# Patient Record
Sex: FEMALE | Race: WHITE | NOT HISPANIC OR LATINO
[De-identification: names, ages, dates, MRNs, and addresses within clinical notes are randomized per-mention and may not be internally consistent; named-entity substitution may affect disease eponyms.]

---

## 2022-02-02 ENCOUNTER — NON-APPOINTMENT (OUTPATIENT)
Age: 77
End: 2022-02-02

## 2022-02-07 ENCOUNTER — NON-APPOINTMENT (OUTPATIENT)
Age: 77
End: 2022-02-07

## 2022-02-07 ENCOUNTER — APPOINTMENT (OUTPATIENT)
Dept: COLORECTAL SURGERY | Facility: CLINIC | Age: 77
End: 2022-02-07
Payer: MEDICARE

## 2022-02-07 VITALS
WEIGHT: 123 LBS | DIASTOLIC BLOOD PRESSURE: 109 MMHG | HEART RATE: 90 BPM | SYSTOLIC BLOOD PRESSURE: 180 MMHG | BODY MASS INDEX: 23.22 KG/M2 | HEIGHT: 61 IN | TEMPERATURE: 98 F

## 2022-02-07 DIAGNOSIS — Z01.818 ENCOUNTER FOR OTHER PREPROCEDURAL EXAMINATION: ICD-10-CM

## 2022-02-07 DIAGNOSIS — Z87.442 PERSONAL HISTORY OF URINARY CALCULI: ICD-10-CM

## 2022-02-07 PROCEDURE — 99205 OFFICE O/P NEW HI 60 MIN: CPT

## 2022-02-07 NOTE — PHYSICAL EXAM
[Exam Deferred] : exam was deferred [Respiratory Effort] : normal respiratory effort [Normal Rate and Rhythm] : normal rate and rhythm [No Edema] : No edema [Alert] : alert [Oriented to Person] : oriented to person [Oriented to Place] : oriented to place [Oriented to Time] : oriented to time [Calm] : calm [Wheezing] : no wheezing was heard [de-identified] : soft, non-tender, non-distended; well healed low midline laparotomy and Mcburney's incisions [de-identified] : female of stated age in NAD [de-identified] : EOMI, no scleral icterus [de-identified] : supple, no JVD [de-identified] : no gross deformity, no gait abnormality

## 2022-02-07 NOTE — HISTORY OF PRESENT ILLNESS
[FreeTextEntry1] : Ms. Vásquez is a pleasant 76 YOF w/ h/o complicated diverticulitis who presents for initial evaluation after first episode of diverticulitis in December. Was briefly admitted to [facility] from 12/19-23  with rigors and on work up was found to have sigmoid diverticulitis with contained perforation. She was discharged on the 23rd after improving with IV abx with a 2 week course of Invanz, and has recovered well without issue. She is here to evaluate for surgical management of her diverticular disease. Currently denies any pain, N/V/D, F/C, or weight changes. Denies any urinary symptoms. Denies any issues with her PICC line or the insertion site.\par \par BH: Daily, 2 soft and brown BMs per day\par Denies any laxative/stool softener/supplemental fiber use. Takes a probiotic supplement.\par Is a vegetarian and usually has fiber however is maintaining low fiber after discharge.\par Has at least 8 glasses of water per day.\par Denies incontinence to feces or flatus, tenesmus, hematochezia or melena. \par \par Last colonoscopy was in 2015 and reportedly had a benign polyp, was told she did not need another one.\par Denies NSAID or ASA use in the last week.\par Denies personal or family history of CRC or IBD.

## 2022-02-07 NOTE — ASSESSMENT
[FreeTextEntry1] : I had extensive discussion with the patient (60 minutes) regarding the diagnosis and treatment options. I recommended that he consider proceeding with a robotic sigmoid colectomy.\par The associated risks, benefits, alternatives of the procedure have been outlined discussed and reviewed with the patient's family. These risks including but not limited to bleeding, infection, anastomotic leak, need for secondary surgery, need for ileostomy or colostomy creation, change in bowel habits,  as well as the risk of heart and lung complications infection and death were detailed. The patient understands these risks and consents the planned procedure. Appropriate  literature regarding surgery and post operative treatment/complications and enhanced recovery pathway has been detailed and reviewed. Consent was obtained. All questions were answered.\par \par Advise CT scan in 2 weeks for repeat assessment/resolution of pelvic abscess.\par \par Pending results of CT scan we will plan for colonoscopy with GI for surveillance of synchronous colorectal cancer and polyps.

## 2022-02-23 ENCOUNTER — APPOINTMENT (OUTPATIENT)
Dept: CT IMAGING | Facility: CLINIC | Age: 77
End: 2022-02-23
Payer: MEDICARE

## 2022-02-23 ENCOUNTER — OUTPATIENT (OUTPATIENT)
Dept: OUTPATIENT SERVICES | Facility: HOSPITAL | Age: 77
LOS: 1 days | End: 2022-02-23

## 2022-02-23 ENCOUNTER — RESULT REVIEW (OUTPATIENT)
Age: 77
End: 2022-02-23

## 2022-02-23 PROCEDURE — 74177 CT ABD & PELVIS W/CONTRAST: CPT | Mod: 26,MG

## 2022-02-23 PROCEDURE — G1004: CPT

## 2022-02-28 ENCOUNTER — NON-APPOINTMENT (OUTPATIENT)
Age: 77
End: 2022-02-28

## 2022-03-09 ENCOUNTER — APPOINTMENT (OUTPATIENT)
Dept: INTERNAL MEDICINE | Facility: CLINIC | Age: 77
End: 2022-03-09
Payer: MEDICARE

## 2022-03-09 ENCOUNTER — NON-APPOINTMENT (OUTPATIENT)
Age: 77
End: 2022-03-09

## 2022-03-09 VITALS
HEIGHT: 61 IN | SYSTOLIC BLOOD PRESSURE: 152 MMHG | DIASTOLIC BLOOD PRESSURE: 92 MMHG | TEMPERATURE: 97.7 F | HEART RATE: 84 BPM | WEIGHT: 123 LBS | BODY MASS INDEX: 23.22 KG/M2 | OXYGEN SATURATION: 98 %

## 2022-03-09 VITALS — DIASTOLIC BLOOD PRESSURE: 82 MMHG | SYSTOLIC BLOOD PRESSURE: 124 MMHG

## 2022-03-09 DIAGNOSIS — Z12.39 ENCOUNTER FOR OTHER SCREENING FOR MALIGNANT NEOPLASM OF BREAST: ICD-10-CM

## 2022-03-09 DIAGNOSIS — Z72.89 OTHER PROBLEMS RELATED TO LIFESTYLE: ICD-10-CM

## 2022-03-09 DIAGNOSIS — Z01.818 ENCOUNTER FOR OTHER PREPROCEDURAL EXAMINATION: ICD-10-CM

## 2022-03-09 PROCEDURE — 36415 COLL VENOUS BLD VENIPUNCTURE: CPT

## 2022-03-09 PROCEDURE — 93000 ELECTROCARDIOGRAM COMPLETE: CPT

## 2022-03-09 PROCEDURE — 99203 OFFICE O/P NEW LOW 30 MIN: CPT | Mod: 25

## 2022-03-09 RX ORDER — NEOMYCIN SULFATE 500 MG/1
500 TABLET ORAL
Qty: 6 | Refills: 0 | Status: DISCONTINUED | COMMUNITY
Start: 2022-02-07 | End: 2022-03-09

## 2022-03-09 RX ORDER — METRONIDAZOLE 500 MG/1
500 TABLET ORAL
Qty: 6 | Refills: 0 | Status: DISCONTINUED | COMMUNITY
Start: 2022-02-07 | End: 2022-03-09

## 2022-03-09 RX ORDER — UBIDECARENONE/VIT E ACET 100MG-5
50 MCG CAPSULE ORAL
Refills: 0 | Status: ACTIVE | COMMUNITY
Start: 2022-03-09

## 2022-03-10 LAB
ALBUMIN SERPL ELPH-MCNC: 4.6 G/DL
ALP BLD-CCNC: 101 U/L
ALT SERPL-CCNC: 13 U/L
ANION GAP SERPL CALC-SCNC: 12 MMOL/L
APPEARANCE: CLEAR
APTT BLD: 33.2 SEC
AST SERPL-CCNC: 15 U/L
BASOPHILS # BLD AUTO: 0.07 K/UL
BASOPHILS NFR BLD AUTO: 1.2 %
BILIRUB SERPL-MCNC: 0.3 MG/DL
BILIRUBIN URINE: NEGATIVE
BLOOD URINE: NORMAL
BUN SERPL-MCNC: 15 MG/DL
CALCIUM SERPL-MCNC: 9.8 MG/DL
CHLORIDE SERPL-SCNC: 104 MMOL/L
CO2 SERPL-SCNC: 26 MMOL/L
COLOR: YELLOW
CREAT SERPL-MCNC: 0.7 MG/DL
EGFR: 90 ML/MIN/1.73M2
EOSINOPHIL # BLD AUTO: 0.09 K/UL
EOSINOPHIL NFR BLD AUTO: 1.6 %
GLUCOSE QUALITATIVE U: NEGATIVE
GLUCOSE SERPL-MCNC: 104 MG/DL
HCT VFR BLD CALC: 43.5 %
HGB BLD-MCNC: 13 G/DL
IMM GRANULOCYTES NFR BLD AUTO: 0.2 %
INR PPP: 1.02 RATIO
KETONES URINE: NEGATIVE
LEUKOCYTE ESTERASE URINE: NEGATIVE
LYMPHOCYTES # BLD AUTO: 2.38 K/UL
LYMPHOCYTES NFR BLD AUTO: 42.1 %
MAN DIFF?: NORMAL
MCHC RBC-ENTMCNC: 27.1 PG
MCHC RBC-ENTMCNC: 29.9 GM/DL
MCV RBC AUTO: 90.8 FL
MONOCYTES # BLD AUTO: 0.32 K/UL
MONOCYTES NFR BLD AUTO: 5.7 %
NEUTROPHILS # BLD AUTO: 2.78 K/UL
NEUTROPHILS NFR BLD AUTO: 49.2 %
NITRITE URINE: NEGATIVE
PH URINE: 6
PLATELET # BLD AUTO: 305 K/UL
POTASSIUM SERPL-SCNC: 4.4 MMOL/L
PROT SERPL-MCNC: 7.3 G/DL
PROTEIN URINE: NEGATIVE
PT BLD: 12 SEC
RBC # BLD: 4.79 M/UL
RBC # FLD: 15.8 %
SODIUM SERPL-SCNC: 142 MMOL/L
SPECIFIC GRAVITY URINE: 1.02
UROBILINOGEN URINE: NORMAL
WBC # FLD AUTO: 5.65 K/UL

## 2022-03-22 ENCOUNTER — TRANSCRIPTION ENCOUNTER (OUTPATIENT)
Age: 77
End: 2022-03-22

## 2022-03-22 ENCOUNTER — APPOINTMENT (OUTPATIENT)
Dept: GASTROENTEROLOGY | Facility: CLINIC | Age: 77
End: 2022-03-22

## 2022-03-22 VITALS
DIASTOLIC BLOOD PRESSURE: 75 MMHG | HEIGHT: 60 IN | RESPIRATION RATE: 18 BRPM | HEART RATE: 63 BPM | OXYGEN SATURATION: 98 % | SYSTOLIC BLOOD PRESSURE: 168 MMHG | TEMPERATURE: 97 F | WEIGHT: 121.47 LBS

## 2022-03-22 NOTE — PRE-OP CHECKLIST - SELECT TESTS ORDERED
BMP/CBC/PT/PTT/INR/COVID-19 BMP/CBC/PT/PTT/INR/EKG/COVID-19 BMP/CBC/PT/PTT/INR/Urinalysis/EKG/COVID-19

## 2022-03-22 NOTE — PRE-OP CHECKLIST - 1.
visitation policy POST-OP DIAGNOSIS:  Pregnancy of unknown anatomic location 16-Jan-2021 09:34:02  Julius Whitaker  Ruptured ovarian cyst 16-Jan-2021 09:33:33 right Julius Whitaker

## 2022-03-22 NOTE — PRE-OP CHECKLIST - WEIGHT IN LBS
MANTOUX TUBERCULIN (a.k.a. TB) SKIN TEST      What is Tuberculosis/TB?  Tuberculosis/TB is an infectious disease, which is spread through the air by tiny germs when people cough, sneeze, speak or sing. TB germs are very small and can remain in the air for a long period of time. TB germs most oft  en settle in your lungs, but may also settle in other organs of your body. TB germs can be present in your body without making you ill. This is called TB INFECTION. TB infection cannot be spread to other people. When your  body’s defenses become weak and can no longer control the TB germs they multiply, this is called TB DISEASE. It can take month(s) to year(s) for TB infection to become TB disease. The TB SKIN TEST can show if a person has  been “infected” by TB germs.    How is the Mantoux Tuberculin/TB skin test given?  A very small amount of a product called Purified Protein Derivative (PPD) is injected just under the top layer of the skin on the forearm. Persons who have been infected by the TB germs usually react to the test by developing swelling at the injection site. The skin test results must be read 48 to 72 hours after given. Failure to return within this time frame means the test will need to be repeated.    Side effects…  Side effects from a skin test are rare and may include itching and discomfort at the injection site and very rarely the possibility of a blister, ulceration or necrosis (dead tissue) at the site if the injection.    Return for tb reading after 12:15pm on Saturday 8/22/19 or before 12:15pm on Sunday 8/23/19 Saturday 12:15pm-4pm OR Sunday 9am-12:15pm    You do not have to wait in line, just sign in on the kiosk and we will see you in between patients waiting.   121.4

## 2022-03-23 ENCOUNTER — RESULT REVIEW (OUTPATIENT)
Age: 77
End: 2022-03-23

## 2022-03-23 ENCOUNTER — APPOINTMENT (OUTPATIENT)
Dept: COLORECTAL SURGERY | Facility: HOSPITAL | Age: 77
End: 2022-03-23

## 2022-03-23 ENCOUNTER — INPATIENT (INPATIENT)
Facility: HOSPITAL | Age: 77
LOS: 1 days | Discharge: HOME | DRG: 331 | End: 2022-03-25
Attending: SURGERY | Admitting: SURGERY
Payer: MEDICARE

## 2022-03-23 DIAGNOSIS — Z90.49 ACQUIRED ABSENCE OF OTHER SPECIFIED PARTS OF DIGESTIVE TRACT: Chronic | ICD-10-CM

## 2022-03-23 DIAGNOSIS — Z90.89 ACQUIRED ABSENCE OF OTHER ORGANS: Chronic | ICD-10-CM

## 2022-03-23 DIAGNOSIS — Z98.891 HISTORY OF UTERINE SCAR FROM PREVIOUS SURGERY: Chronic | ICD-10-CM

## 2022-03-23 LAB
BLD GP AB SCN SERPL QL: NEGATIVE — SIGNIFICANT CHANGE UP
RH IG SCN BLD-IMP: POSITIVE — SIGNIFICANT CHANGE UP

## 2022-03-23 PROCEDURE — 44213 LAP MOBIL SPLENIC FL ADD-ON: CPT | Mod: GC

## 2022-03-23 PROCEDURE — 49322 LAPAROSCOPY ASPIRATION: CPT | Mod: GC

## 2022-03-23 PROCEDURE — 99222 1ST HOSP IP/OBS MODERATE 55: CPT

## 2022-03-23 PROCEDURE — 88304 TISSUE EXAM BY PATHOLOGIST: CPT | Mod: 26

## 2022-03-23 PROCEDURE — 44207 L COLECTOMY/COLOPROCTOSTOMY: CPT | Mod: AS

## 2022-03-23 PROCEDURE — 44120 REMOVAL OF SMALL INTESTINE: CPT | Mod: AS

## 2022-03-23 PROCEDURE — 45330 DIAGNOSTIC SIGMOIDOSCOPY: CPT | Mod: GC

## 2022-03-23 PROCEDURE — 49020 DRAINAGE ABDOM ABSCESS OPEN: CPT | Mod: AS,59

## 2022-03-23 PROCEDURE — 44202 LAP ENTERECTOMY: CPT | Mod: GC

## 2022-03-23 PROCEDURE — 88307 TISSUE EXAM BY PATHOLOGIST: CPT | Mod: 26

## 2022-03-23 PROCEDURE — 44207 L COLECTOMY/COLOPROCTOSTOMY: CPT | Mod: GC

## 2022-03-23 PROCEDURE — 44213 LAP MOBIL SPLENIC FL ADD-ON: CPT | Mod: AS

## 2022-03-23 DEVICE — STAPLER ETHICON PROXIMATE 75MM WITH BLUE RELOAD: Type: IMPLANTABLE DEVICE | Status: FUNCTIONAL

## 2022-03-23 DEVICE — STAPLER COVIDIEN TRI-STAPLE 60MM PURPLE RELOAD: Type: IMPLANTABLE DEVICE | Status: FUNCTIONAL

## 2022-03-23 DEVICE — XI STAPLER SUREFORM RELOAD 60 GREEN: Type: IMPLANTABLE DEVICE | Status: FUNCTIONAL

## 2022-03-23 DEVICE — CLIP APPLIER ETHICON LIGACLIP 11.5" MEDIUM: Type: IMPLANTABLE DEVICE | Status: FUNCTIONAL

## 2022-03-23 DEVICE — STAPLER COVIDIEN PURSTRING 65MM: Type: IMPLANTABLE DEVICE | Status: FUNCTIONAL

## 2022-03-23 DEVICE — STAPLER COVIDIEN EEA CIRCULAR DST 28MM 4.5MM BLUE: Type: IMPLANTABLE DEVICE | Status: FUNCTIONAL

## 2022-03-23 RX ORDER — HYDROMORPHONE HYDROCHLORIDE 2 MG/ML
0.5 INJECTION INTRAMUSCULAR; INTRAVENOUS; SUBCUTANEOUS EVERY 4 HOURS
Refills: 0 | Status: DISCONTINUED | OUTPATIENT
Start: 2022-03-23 | End: 2022-03-25

## 2022-03-23 RX ORDER — HEPARIN SODIUM 5000 [USP'U]/ML
5000 INJECTION INTRAVENOUS; SUBCUTANEOUS ONCE
Refills: 0 | Status: COMPLETED | OUTPATIENT
Start: 2022-03-23 | End: 2022-03-23

## 2022-03-23 RX ORDER — BUPIVACAINE 13.3 MG/ML
20 INJECTION, SUSPENSION, LIPOSOMAL INFILTRATION ONCE
Refills: 0 | Status: DISCONTINUED | OUTPATIENT
Start: 2022-03-23 | End: 2022-03-25

## 2022-03-23 RX ORDER — ACETAMINOPHEN 500 MG
1000 TABLET ORAL ONCE
Refills: 0 | Status: COMPLETED | OUTPATIENT
Start: 2022-03-23 | End: 2022-03-23

## 2022-03-23 RX ORDER — ACETAMINOPHEN 500 MG
1000 TABLET ORAL ONCE
Refills: 0 | Status: DISCONTINUED | OUTPATIENT
Start: 2022-03-23 | End: 2022-03-23

## 2022-03-23 RX ORDER — ACETAMINOPHEN 500 MG
1000 TABLET ORAL EVERY 6 HOURS
Refills: 0 | Status: DISCONTINUED | OUTPATIENT
Start: 2022-03-23 | End: 2022-03-25

## 2022-03-23 RX ORDER — HEPARIN SODIUM 5000 [USP'U]/ML
5000 INJECTION INTRAVENOUS; SUBCUTANEOUS EVERY 8 HOURS
Refills: 0 | Status: DISCONTINUED | OUTPATIENT
Start: 2022-03-23 | End: 2022-03-25

## 2022-03-23 RX ORDER — HALOPERIDOL DECANOATE 100 MG/ML
1 INJECTION INTRAMUSCULAR ONCE
Refills: 0 | Status: COMPLETED | OUTPATIENT
Start: 2022-03-23 | End: 2022-03-23

## 2022-03-23 RX ORDER — HEPARIN SODIUM 5000 [USP'U]/ML
5000 INJECTION INTRAVENOUS; SUBCUTANEOUS ONCE
Refills: 0 | Status: DISCONTINUED | OUTPATIENT
Start: 2022-03-23 | End: 2022-03-23

## 2022-03-23 RX ORDER — ONDANSETRON 8 MG/1
4 TABLET, FILM COATED ORAL EVERY 6 HOURS
Refills: 0 | Status: DISCONTINUED | OUTPATIENT
Start: 2022-03-23 | End: 2022-03-25

## 2022-03-23 RX ORDER — ONDANSETRON 8 MG/1
4 TABLET, FILM COATED ORAL ONCE
Refills: 0 | Status: COMPLETED | OUTPATIENT
Start: 2022-03-23 | End: 2022-03-23

## 2022-03-23 RX ORDER — KETOROLAC TROMETHAMINE 30 MG/ML
15 SYRINGE (ML) INJECTION ONCE
Refills: 0 | Status: DISCONTINUED | OUTPATIENT
Start: 2022-03-23 | End: 2022-03-23

## 2022-03-23 RX ORDER — KETOROLAC TROMETHAMINE 30 MG/ML
15 SYRINGE (ML) INJECTION EVERY 6 HOURS
Refills: 0 | Status: DISCONTINUED | OUTPATIENT
Start: 2022-03-23 | End: 2022-03-25

## 2022-03-23 RX ORDER — ACETAMINOPHEN 500 MG
1000 TABLET ORAL EVERY 6 HOURS
Refills: 0 | Status: DISCONTINUED | OUTPATIENT
Start: 2022-03-23 | End: 2022-03-23

## 2022-03-23 RX ORDER — SODIUM CHLORIDE 9 MG/ML
1000 INJECTION, SOLUTION INTRAVENOUS
Refills: 0 | Status: DISCONTINUED | OUTPATIENT
Start: 2022-03-23 | End: 2022-03-24

## 2022-03-23 RX ORDER — SCOPALAMINE 1 MG/3D
1 PATCH, EXTENDED RELEASE TRANSDERMAL ONCE
Refills: 0 | Status: COMPLETED | OUTPATIENT
Start: 2022-03-23 | End: 2022-03-23

## 2022-03-23 RX ADMIN — Medication 400 MILLIGRAM(S): at 14:19

## 2022-03-23 RX ADMIN — HALOPERIDOL DECANOATE 1 MILLIGRAM(S): 100 INJECTION INTRAMUSCULAR at 13:57

## 2022-03-23 RX ADMIN — Medication 15 MILLIGRAM(S): at 22:19

## 2022-03-23 RX ADMIN — SCOPALAMINE 1 PATCH: 1 PATCH, EXTENDED RELEASE TRANSDERMAL at 13:20

## 2022-03-23 RX ADMIN — HYDROMORPHONE HYDROCHLORIDE 0.5 MILLIGRAM(S): 2 INJECTION INTRAMUSCULAR; INTRAVENOUS; SUBCUTANEOUS at 13:02

## 2022-03-23 RX ADMIN — Medication 1000 MILLIGRAM(S): at 07:17

## 2022-03-23 RX ADMIN — Medication 1000 MILLIGRAM(S): at 14:35

## 2022-03-23 RX ADMIN — Medication 15 MILLIGRAM(S): at 17:20

## 2022-03-23 RX ADMIN — HYDROMORPHONE HYDROCHLORIDE 0.5 MILLIGRAM(S): 2 INJECTION INTRAMUSCULAR; INTRAVENOUS; SUBCUTANEOUS at 13:30

## 2022-03-23 RX ADMIN — HEPARIN SODIUM 5000 UNIT(S): 5000 INJECTION INTRAVENOUS; SUBCUTANEOUS at 18:49

## 2022-03-23 RX ADMIN — ONDANSETRON 4 MILLIGRAM(S): 8 TABLET, FILM COATED ORAL at 12:49

## 2022-03-23 RX ADMIN — Medication 15 MILLIGRAM(S): at 22:45

## 2022-03-23 RX ADMIN — HEPARIN SODIUM 5000 UNIT(S): 5000 INJECTION INTRAVENOUS; SUBCUTANEOUS at 07:17

## 2022-03-23 RX ADMIN — Medication 15 MILLIGRAM(S): at 17:07

## 2022-03-23 RX ADMIN — SCOPALAMINE 1 PATCH: 1 PATCH, EXTENDED RELEASE TRANSDERMAL at 19:04

## 2022-03-23 RX ADMIN — Medication 1 DROP(S): at 18:49

## 2022-03-23 NOTE — H&P ADULT - NSHPPHYSICALEXAM_GEN_ALL_CORE
Constitutional: WDWN NAD  Heart: S1 S2  Lungs: no use of accessory muscles  Abdomen: soft, nontender, nondistended, without masses, erythema, ecchymosis  Extremities: no edema

## 2022-03-23 NOTE — H&P ADULT - ASSESSMENT
76 year old female with PMH of multiple buts of diverticulitis presents for sigmoid colon resection.    Plan:  to OR  admission post op  ERAS protocol  Colon bundle

## 2022-03-23 NOTE — BRIEF OPERATIVE NOTE - OPERATION/FINDINGS
Veress needle access. Optiview placement of 8mm port periumbilically. Rest of ports placed under direct visualization. Bilateral ureters identified and preserved. Medial to lateral dissection of sigmoid colon. High ligation of ESTELITA. Splenic flexure partially mobilized. Distal margin taken with robotic stapler green load at healthy rectum. Distal ileum extracoporalized, portion of small bowel with sersoal tear resected and side to side anastamosis with endoGIA purple load x 2 and GI75 x 1. Colon exteriorized via 8cm off midline incision and proximal margin taken with purse string device and scissors at healthy colon. ICG confirmed healthy flow. Anvil placed into colon, and returned to abdomen. End to side colorectal anastomosis created with 32 EEA stapler. Flexible sigmoidoscopy with healthy mucosa and nonbleeding staple line; air leak test negative. Closing tray utilized; gowns and gloves changed. Peritoneum closed with 0 Vicryl running; anterior fascia closed with #1 PDS running. Closed skin with 3-0 Vicryl deep dermal and 4-0 Monocryl subcuticular.

## 2022-03-23 NOTE — PACU DISCHARGE NOTE - COMMENTS
patient met PACU criteria, VSS, abdominal surgical incisions with dermabond intact, simon catheter to bedside drainage with adequate UO patient met PACU criteria, VSS, abdominal surgical incisions with dermabond intact, simon catheter to bedside drainage with adequate urine output, L UQ EVE to self suction with moderate serosanguinous drainage, report endorsed to 9Uris REYES Peters, patient transferred to 9616 bed2 via bed accompanied by PCAx2

## 2022-03-23 NOTE — H&P ADULT - HISTORY OF PRESENT ILLNESS
76 year old female with PMH of multiple buts of diverticulitis presents for sigmoid colon resection. Patient states first episode of diverticulitis in December and was admitted for sigmoid diverticulitis with contained perforation. Last colonoscopy in 2015 and reportedly had a benign polyp. Currently without complaints.

## 2022-03-23 NOTE — H&P ADULT - NSICDXPASTSURGICALHX_GEN_ALL_CORE_FT
PAST SURGICAL HISTORY:  H/O:  section X4    History of appendectomy     S/P tonsillectomy and adenoidectomy

## 2022-03-24 ENCOUNTER — TRANSCRIPTION ENCOUNTER (OUTPATIENT)
Age: 77
End: 2022-03-24

## 2022-03-24 LAB
ANION GAP SERPL CALC-SCNC: 9 MMOL/L — SIGNIFICANT CHANGE UP (ref 5–17)
BUN SERPL-MCNC: 10 MG/DL — SIGNIFICANT CHANGE UP (ref 7–23)
CALCIUM SERPL-MCNC: 9 MG/DL — SIGNIFICANT CHANGE UP (ref 8.4–10.5)
CHLORIDE SERPL-SCNC: 103 MMOL/L — SIGNIFICANT CHANGE UP (ref 96–108)
CO2 SERPL-SCNC: 22 MMOL/L — SIGNIFICANT CHANGE UP (ref 22–31)
CREAT SERPL-MCNC: 0.93 MG/DL — SIGNIFICANT CHANGE UP (ref 0.5–1.3)
EGFR: 64 ML/MIN/1.73M2 — SIGNIFICANT CHANGE UP
GLUCOSE SERPL-MCNC: 96 MG/DL — SIGNIFICANT CHANGE UP (ref 70–99)
HCT VFR BLD CALC: 36.6 % — SIGNIFICANT CHANGE UP (ref 34.5–45)
HCV AB S/CO SERPL IA: 0.04 S/CO — SIGNIFICANT CHANGE UP
HCV AB SERPL-IMP: SIGNIFICANT CHANGE UP
HGB BLD-MCNC: 11.3 G/DL — LOW (ref 11.5–15.5)
MAGNESIUM SERPL-MCNC: 1.7 MG/DL — SIGNIFICANT CHANGE UP (ref 1.6–2.6)
MCHC RBC-ENTMCNC: 28.1 PG — SIGNIFICANT CHANGE UP (ref 27–34)
MCHC RBC-ENTMCNC: 30.9 GM/DL — LOW (ref 32–36)
MCV RBC AUTO: 91 FL — SIGNIFICANT CHANGE UP (ref 80–100)
NRBC # BLD: 0 /100 WBCS — SIGNIFICANT CHANGE UP (ref 0–0)
PHOSPHATE SERPL-MCNC: 3 MG/DL — SIGNIFICANT CHANGE UP (ref 2.5–4.5)
PLATELET # BLD AUTO: 209 K/UL — SIGNIFICANT CHANGE UP (ref 150–400)
POTASSIUM SERPL-MCNC: 3.6 MMOL/L — SIGNIFICANT CHANGE UP (ref 3.5–5.3)
POTASSIUM SERPL-SCNC: 3.6 MMOL/L — SIGNIFICANT CHANGE UP (ref 3.5–5.3)
RBC # BLD: 4.02 M/UL — SIGNIFICANT CHANGE UP (ref 3.8–5.2)
RBC # FLD: 14.5 % — SIGNIFICANT CHANGE UP (ref 10.3–14.5)
SODIUM SERPL-SCNC: 134 MMOL/L — LOW (ref 135–145)
WBC # BLD: 7.57 K/UL — SIGNIFICANT CHANGE UP (ref 3.8–10.5)
WBC # FLD AUTO: 7.57 K/UL — SIGNIFICANT CHANGE UP (ref 3.8–10.5)

## 2022-03-24 RX ORDER — POTASSIUM CHLORIDE 20 MEQ
20 PACKET (EA) ORAL ONCE
Refills: 0 | Status: COMPLETED | OUTPATIENT
Start: 2022-03-24 | End: 2022-03-24

## 2022-03-24 RX ORDER — POTASSIUM CHLORIDE 20 MEQ
10 PACKET (EA) ORAL
Refills: 0 | Status: COMPLETED | OUTPATIENT
Start: 2022-03-24 | End: 2022-03-24

## 2022-03-24 RX ORDER — MAGNESIUM SULFATE 500 MG/ML
1 VIAL (ML) INJECTION ONCE
Refills: 0 | Status: COMPLETED | OUTPATIENT
Start: 2022-03-24 | End: 2022-03-24

## 2022-03-24 RX ADMIN — Medication 100 GRAM(S): at 09:58

## 2022-03-24 RX ADMIN — SCOPALAMINE 1 PATCH: 1 PATCH, EXTENDED RELEASE TRANSDERMAL at 18:31

## 2022-03-24 RX ADMIN — Medication 20 MILLIEQUIVALENT(S): at 17:46

## 2022-03-24 RX ADMIN — Medication 1000 MILLIGRAM(S): at 17:00

## 2022-03-24 RX ADMIN — Medication 15 MILLIGRAM(S): at 03:26

## 2022-03-24 RX ADMIN — Medication 15 MILLIGRAM(S): at 11:00

## 2022-03-24 RX ADMIN — Medication 15 MILLIGRAM(S): at 10:34

## 2022-03-24 RX ADMIN — HEPARIN SODIUM 5000 UNIT(S): 5000 INJECTION INTRAVENOUS; SUBCUTANEOUS at 09:57

## 2022-03-24 RX ADMIN — Medication 15 MILLIGRAM(S): at 16:40

## 2022-03-24 RX ADMIN — Medication 15 MILLIGRAM(S): at 22:44

## 2022-03-24 RX ADMIN — Medication 15 MILLIGRAM(S): at 03:40

## 2022-03-24 RX ADMIN — Medication 1000 MILLIGRAM(S): at 16:09

## 2022-03-24 RX ADMIN — SCOPALAMINE 1 PATCH: 1 PATCH, EXTENDED RELEASE TRANSDERMAL at 06:12

## 2022-03-24 RX ADMIN — Medication 15 MILLIGRAM(S): at 16:10

## 2022-03-24 RX ADMIN — Medication 15 MILLIGRAM(S): at 22:14

## 2022-03-24 RX ADMIN — HEPARIN SODIUM 5000 UNIT(S): 5000 INJECTION INTRAVENOUS; SUBCUTANEOUS at 01:00

## 2022-03-24 RX ADMIN — Medication 100 MILLIEQUIVALENT(S): at 13:18

## 2022-03-24 RX ADMIN — HEPARIN SODIUM 5000 UNIT(S): 5000 INJECTION INTRAVENOUS; SUBCUTANEOUS at 17:47

## 2022-03-24 RX ADMIN — Medication 100 MILLIEQUIVALENT(S): at 14:53

## 2022-03-24 NOTE — DISCHARGE NOTE PROVIDER - CARE PROVIDERS DIRECT ADDRESSES
,[email or web address]

## 2022-03-24 NOTE — DISCHARGE NOTE PROVIDER - CARE PROVIDER_API CALL
Dm Vela)  ColonRectal Surgery; Surgery  [facility], [address]nd [place] [address]  Phone: [phone]  Fax: [phone]  Follow Up Time: 1 week

## 2022-03-24 NOTE — DISCHARGE NOTE PROVIDER - NSDCCPTREATMENT_GEN_ALL_CORE_FT
PRINCIPAL PROCEDURE  Procedure: Robot-assisted sigmoid colectomy  Findings and Treatment:       SECONDARY PROCEDURE  Procedure: Small bowel resection  Findings and Treatment:

## 2022-03-24 NOTE — DISCHARGE NOTE PROVIDER - NSDCFUADDINST_GEN_ALL_CORE_FT
General Discharge Instructions:  Please resume all regular home medications unless specifically advised not to take a particular medication. Also, please take any new medications as prescribed.  Please get plenty of rest, continue to ambulate several times per day, and drink adequate amounts of fluids. Avoid lifting weights greater than 5-10 lbs until you follow-up with your surgeon, who will instruct you further regarding activity restrictions.  Avoid driving or operating heavy machinery while taking pain medications.  Please follow-up with your surgeon and Primary Care Provider (PCP) as advised.  Incision Care:  *Please call your doctor or nurse practitioner if you have increased pain, swelling, redness, or drainage from the incision site.  *Avoid swimming and baths until your follow-up appointment.  *You may shower, and wash surgical incisions with a mild soap and warm water. Gently pat the area dry.  *If you have dermabond, they will fall off on their own.  Warning Signs:  Please call your doctor or nurse practitioner if you experience the following:  *You experience new chest pain, pressure, squeezing or tightness.  *New or worsening cough, shortness of breath, or wheeze.  *If you are vomiting and cannot keep down fluids or your medications.  *You are getting dehydrated due to continued vomiting, diarrhea, or other reasons. Signs of dehydration include dry mouth, rapid heartbeat, or feeling dizzy or faint when standing.  *You see blood or dark/black material when you vomit or have a bowel movement.  *You experience burning when you urinate, have blood in your urine, or experience a discharge.  *Your pain is not improving within 8-12 hours or is not gone within 24 hours. Call or return immediately if your pain is getting worse, changes location, or moves to your chest or back.  *You have shaking chills, or fever greater than 101.5 degrees Fahrenheit or 38 degrees Celsius.  *Any change in your symptoms, or any new symptoms that concern you.  Please continue a LOW-FIBER DIET. Listed below are some foods you may eat and those you should avoid.   --Allowed foods:  White bread without nuts and seeds  White rice, plain white pasta, and crackers  Refined hot cereals, such as Cream of Wheat, or cold cereals with less than 1 gram of fiber per serving  Pancakes or waffles made from white refined flour  Most canned or well-cooked vegetables and fruits without skins or seeds  Fruit and vegetable juice with little or no pulp, fruit-flavored drinks, and flavored goncalves  Tender meat, poultry, fish, eggs and tofu  Milk and foods made from milk — such as yogurt, pudding, ice cream, cheeses and sour cream — if tolerated  Butter, margarine, oils and salad dressings without seeds  --Foods to avoid:  Whole-wheat or whole-grain breads, cereals and pasta  Brown or wild rice and other whole grains, such as oats, kasha, barley and quinoa  Dried fruits and prune juice  Raw fruit, including those with seeds, skin or membranes, such as berries  Raw or undercooked vegetables, including corn  Dried beans, peas and lentils  Seeds and nuts and foods containing them, including peanut butter and other nut butters  Coconut  Popcorn   Follow up with Dr. Vela in 1-2 weeks. Call the office at [phone] to schedule your appointment. You may shower; soap and water over incision sites. Do not scrub. Pat dry when done. No tub bathing or swimming until cleared. Keep incision sites out of the sun as scars will darken. Ambulate as tolerated, but no heavy lifting (>10lbs) or strenuous exercise. You should be urinating at least 3-4x per day. Call the office if you experience increasing abdominal pain, nausea, vomiting, or temperature >101 F.      General Discharge Instructions:  Please resume all regular home medications unless specifically advised not to take a particular medication. Also, please take any new medications as prescribed.  Please get plenty of rest, continue to ambulate several times per day, and drink adequate amounts of fluids. Avoid lifting weights greater than 5-10 lbs until you follow-up with your surgeon, who will instruct you further regarding activity restrictions.  Avoid driving or operating heavy machinery while taking pain medications.  Please follow-up with your surgeon and Primary Care Provider (PCP) as advised.  Incision Care:  *Please call your doctor or nurse practitioner if you have increased pain, swelling, redness, or drainage from the incision site.  *Avoid swimming and baths until your follow-up appointment.  *You may shower, and wash surgical incisions with a mild soap and warm water. Gently pat the area dry.  *If you have dermabond, they will fall off on their own.  Warning Signs:  Please call your doctor or nurse practitioner if you experience the following:  *You experience new chest pain, pressure, squeezing or tightness.  *New or worsening cough, shortness of breath, or wheeze.  *If you are vomiting and cannot keep down fluids or your medications.  *You are getting dehydrated due to continued vomiting, diarrhea, or other reasons. Signs of dehydration include dry mouth, rapid heartbeat, or feeling dizzy or faint when standing.  *You see blood or dark/black material when you vomit or have a bowel movement.  *You experience burning when you urinate, have blood in your urine, or experience a discharge.  *Your pain is not improving within 8-12 hours or is not gone within 24 hours. Call or return immediately if your pain is getting worse, changes location, or moves to your chest or back.  *You have shaking chills, or fever greater than 101.5 degrees Fahrenheit or 38 degrees Celsius.  *Any change in your symptoms, or any new symptoms that concern you.  Please continue a LOW-FIBER DIET. Listed below are some foods you may eat and those you should avoid.   --Allowed foods:  White bread without nuts and seeds  White rice, plain white pasta, and crackers  Refined hot cereals, such as Cream of Wheat, or cold cereals with less than 1 gram of fiber per serving  Pancakes or waffles made from white refined flour  Most canned or well-cooked vegetables and fruits without skins or seeds  Fruit and vegetable juice with little or no pulp, fruit-flavored drinks, and flavored goncalves  Tender meat, poultry, fish, eggs and tofu  Milk and foods made from milk — such as yogurt, pudding, ice cream, cheeses and sour cream — if tolerated  Butter, margarine, oils and salad dressings without seeds  --Foods to avoid:  Whole-wheat or whole-grain breads, cereals and pasta  Brown or wild rice and other whole grains, such as oats, kasha, barley and quinoa  Dried fruits and prune juice  Raw fruit, including those with seeds, skin or membranes, such as berries  Raw or undercooked vegetables, including corn  Dried beans, peas and lentils  Seeds and nuts and foods containing them, including peanut butter and other nut butters  Coconut  Popcorn    New medications: Percocet as needed for pain. Take colace as needed if you experience constipation after taking percocet.

## 2022-03-24 NOTE — DISCHARGE NOTE PROVIDER - NSDCMRMEDTOKEN_GEN_ALL_CORE_FT
Colace 100 mg oral capsule: 1 cap(s) orally 2 times a day, As Needed -for constipation   oxycodone-acetaminophen 5 mg-325 mg oral tablet: 1 tab(s) orally every 6 hours, As Needed -for severe pain MDD:4 tablets

## 2022-03-24 NOTE — DISCHARGE NOTE PROVIDER - NSDCCPCAREPLAN_GEN_ALL_CORE_FT
PRINCIPAL DISCHARGE DIAGNOSIS  Diagnosis: Diverticulitis  Assessment and Plan of Treatment: s/p sigmoidectomy

## 2022-03-24 NOTE — PROGRESS NOTE ADULT - ASSESSMENT
76 year old female with PMH of multiple buts of diverticulitis presents for sigmoid colon resection. Patient states first episode of diverticulitis in December and was admitted for sigmoid diverticulitis with contained perforation. Last colonoscopy in 2015 and reportedly had a benign polyp. Pt is now s/p elective robotic assisted sigmoidectomy, small bowel resection on 3/23.    Pain/nausea control prn  LFD/IVF  HSQ/SCDs  EVE x 1  OOBA/IS  Barreto removed, TOV pending   AM labs

## 2022-03-24 NOTE — DISCHARGE NOTE PROVIDER - HOSPITAL COURSE
76 year old female with PMH of diverticulitis presented on day of admission for sigmoid colon resection. Patient stated first episode of diverticulitis in December and was admitted for sigmoid diverticulitis with contained perforation. Last colonoscopy in 2015 and reportedly had a benign polyp. Patient was without complaints. Post operatively the patient was admitted for further management and monitoring. Her postoperative course was unremarkable with advancement of diet, passing trial of void, and pain control. On day of discharge patient was stable to be d/c'd home.   76 year old female with PMH of diverticulitis presented on day of admission for sigmoid colon resection. Taken for robot assisted sigmoidectomy and small bowel resection on 3/23. Patient stated first episode of diverticulitis in December and was admitted for sigmoid diverticulitis with contained perforation. Last colonoscopy in 2015 and reportedly had a benign polyp. Patient was without complaints. Post operatively the patient was admitted for further management and monitoring. Her postoperative course was unremarkable with advancement of diet, passing trial of void, pain controlled, adequate bowel function. Pt experienced appropriate dark stools s/p sigmoidectomy which were monitored closely by vitals and labs (CBC stable), pt will continue to monitor at home. At time of discharge pt denies dizziness, N, V, CP, SOB. Pt is HD stable and medically ready for discharge.      76 year old female with PMH of diverticulitis presented on day of admission for sigmoid colon resection. Taken for robot assisted sigmoidectomy and small bowel resection on 3/23. Patient stated first episode of diverticulitis in December and was admitted for sigmoid diverticulitis with contained perforation. Last colonoscopy in 2015 and reportedly had a benign polyp. Patient was without complaints. Post operatively the patient was admitted for further management and monitoring. Her postoperative course was unremarkable with advancement of diet, passing trial of void, pain controlled, adequate bowel function. Pt experienced appropriate dark stools s/p sigmoidectomy which were monitored closely by vitals and labs (CBC stable), pt will continue to monitor at home. At time of discharge pt denies dizziness, N, V, CP, SOB. Pt is HD stable and medically ready for discharge. EVE drain was removed on day of discharge.

## 2022-03-25 ENCOUNTER — TRANSCRIPTION ENCOUNTER (OUTPATIENT)
Age: 77
End: 2022-03-25

## 2022-03-25 VITALS
TEMPERATURE: 99 F | SYSTOLIC BLOOD PRESSURE: 138 MMHG | RESPIRATION RATE: 17 BRPM | HEART RATE: 76 BPM | DIASTOLIC BLOOD PRESSURE: 70 MMHG | OXYGEN SATURATION: 95 %

## 2022-03-25 LAB
ANION GAP SERPL CALC-SCNC: 6 MMOL/L — SIGNIFICANT CHANGE UP (ref 5–17)
BUN SERPL-MCNC: 8 MG/DL — SIGNIFICANT CHANGE UP (ref 7–23)
CALCIUM SERPL-MCNC: 9 MG/DL — SIGNIFICANT CHANGE UP (ref 8.4–10.5)
CHLORIDE SERPL-SCNC: 105 MMOL/L — SIGNIFICANT CHANGE UP (ref 96–108)
CO2 SERPL-SCNC: 28 MMOL/L — SIGNIFICANT CHANGE UP (ref 22–31)
CREAT SERPL-MCNC: 0.68 MG/DL — SIGNIFICANT CHANGE UP (ref 0.5–1.3)
EGFR: 90 ML/MIN/1.73M2 — SIGNIFICANT CHANGE UP
GLUCOSE SERPL-MCNC: 115 MG/DL — HIGH (ref 70–99)
HCT VFR BLD CALC: 31.2 % — LOW (ref 34.5–45)
HGB BLD-MCNC: 10.2 G/DL — LOW (ref 11.5–15.5)
MAGNESIUM SERPL-MCNC: 1.8 MG/DL — SIGNIFICANT CHANGE UP (ref 1.6–2.6)
MCHC RBC-ENTMCNC: 28 PG — SIGNIFICANT CHANGE UP (ref 27–34)
MCHC RBC-ENTMCNC: 32.7 GM/DL — SIGNIFICANT CHANGE UP (ref 32–36)
MCV RBC AUTO: 85.7 FL — SIGNIFICANT CHANGE UP (ref 80–100)
NRBC # BLD: 0 /100 WBCS — SIGNIFICANT CHANGE UP (ref 0–0)
PHOSPHATE SERPL-MCNC: 2 MG/DL — LOW (ref 2.5–4.5)
PLATELET # BLD AUTO: 244 K/UL — SIGNIFICANT CHANGE UP (ref 150–400)
POTASSIUM SERPL-MCNC: 4.5 MMOL/L — SIGNIFICANT CHANGE UP (ref 3.5–5.3)
POTASSIUM SERPL-SCNC: 4.5 MMOL/L — SIGNIFICANT CHANGE UP (ref 3.5–5.3)
RBC # BLD: 3.64 M/UL — LOW (ref 3.8–5.2)
RBC # FLD: 14.4 % — SIGNIFICANT CHANGE UP (ref 10.3–14.5)
SODIUM SERPL-SCNC: 139 MMOL/L — SIGNIFICANT CHANGE UP (ref 135–145)
WBC # BLD: 7.14 K/UL — SIGNIFICANT CHANGE UP (ref 3.8–10.5)
WBC # FLD AUTO: 7.14 K/UL — SIGNIFICANT CHANGE UP (ref 3.8–10.5)

## 2022-03-25 PROCEDURE — 87184 SC STD DISK METHOD PER PLATE: CPT

## 2022-03-25 PROCEDURE — 36415 COLL VENOUS BLD VENIPUNCTURE: CPT

## 2022-03-25 PROCEDURE — 85027 COMPLETE CBC AUTOMATED: CPT

## 2022-03-25 PROCEDURE — 87075 CULTR BACTERIA EXCEPT BLOOD: CPT

## 2022-03-25 PROCEDURE — 86901 BLOOD TYPING SEROLOGIC RH(D): CPT

## 2022-03-25 PROCEDURE — 88304 TISSUE EXAM BY PATHOLOGIST: CPT

## 2022-03-25 PROCEDURE — C1889: CPT

## 2022-03-25 PROCEDURE — 86803 HEPATITIS C AB TEST: CPT

## 2022-03-25 PROCEDURE — 86850 RBC ANTIBODY SCREEN: CPT

## 2022-03-25 PROCEDURE — 84100 ASSAY OF PHOSPHORUS: CPT

## 2022-03-25 PROCEDURE — 83735 ASSAY OF MAGNESIUM: CPT

## 2022-03-25 PROCEDURE — C9399: CPT

## 2022-03-25 PROCEDURE — 88307 TISSUE EXAM BY PATHOLOGIST: CPT

## 2022-03-25 PROCEDURE — 80048 BASIC METABOLIC PNL TOTAL CA: CPT

## 2022-03-25 PROCEDURE — 87205 SMEAR GRAM STAIN: CPT

## 2022-03-25 PROCEDURE — 87070 CULTURE OTHR SPECIMN AEROBIC: CPT

## 2022-03-25 PROCEDURE — 87186 SC STD MICRODIL/AGAR DIL: CPT

## 2022-03-25 PROCEDURE — 82962 GLUCOSE BLOOD TEST: CPT

## 2022-03-25 PROCEDURE — 86900 BLOOD TYPING SEROLOGIC ABO: CPT

## 2022-03-25 PROCEDURE — S2900: CPT

## 2022-03-25 RX ORDER — DOCUSATE SODIUM 100 MG
1 CAPSULE ORAL
Qty: 14 | Refills: 0
Start: 2022-03-25 | End: 2022-03-31

## 2022-03-25 RX ORDER — SODIUM,POTASSIUM PHOSPHATES 278-250MG
2 POWDER IN PACKET (EA) ORAL ONCE
Refills: 0 | Status: COMPLETED | OUTPATIENT
Start: 2022-03-25 | End: 2022-03-25

## 2022-03-25 RX ORDER — MAGNESIUM SULFATE 500 MG/ML
1 VIAL (ML) INJECTION ONCE
Refills: 0 | Status: COMPLETED | OUTPATIENT
Start: 2022-03-25 | End: 2022-03-25

## 2022-03-25 RX ORDER — BENZOCAINE AND MENTHOL 5; 1 G/100ML; G/100ML
1 LIQUID ORAL
Refills: 0 | Status: DISCONTINUED | OUTPATIENT
Start: 2022-03-25 | End: 2022-03-25

## 2022-03-25 RX ADMIN — Medication 15 MILLIGRAM(S): at 10:55

## 2022-03-25 RX ADMIN — Medication 15 MILLIGRAM(S): at 05:55

## 2022-03-25 RX ADMIN — SCOPALAMINE 1 PATCH: 1 PATCH, EXTENDED RELEASE TRANSDERMAL at 06:31

## 2022-03-25 RX ADMIN — Medication 15 MILLIGRAM(S): at 05:25

## 2022-03-25 RX ADMIN — HEPARIN SODIUM 5000 UNIT(S): 5000 INJECTION INTRAVENOUS; SUBCUTANEOUS at 00:45

## 2022-03-25 RX ADMIN — BENZOCAINE AND MENTHOL 1 LOZENGE: 5; 1 LIQUID ORAL at 10:39

## 2022-03-25 RX ADMIN — Medication 15 MILLIGRAM(S): at 10:40

## 2022-03-25 NOTE — DISCHARGE NOTE NURSING/CASE MANAGEMENT/SOCIAL WORK - PATIENT PORTAL LINK FT
You can access the FollowMyHealth Patient Portal offered by [facility] by registering at the following website: [email or web address]. By joining [facility]’s FollowMyHealth portal, you will also be able to view your health information using other applications (apps) compatible with our system.

## 2022-03-25 NOTE — DISCHARGE NOTE NURSING/CASE MANAGEMENT/SOCIAL WORK - NSDCPEFALRISK_GEN_ALL_CORE
For information on Fall & Injury Prevention, visit: [email or web address] OR  [email or web address] OR  https://www.cdc.gov/steadi/patient.html

## 2022-03-25 NOTE — PROGRESS NOTE ADULT - SUBJECTIVE AND OBJECTIVE BOX
STATUS POST:  3/23: robotic assisted sigmoidectomy, small bowel resection     SUBJECTIVE: Patient seen and examined bedside by chief resident. Patient passing flatus and having liquid dark BMs. Denies nausea and vomiting.     heparin   Injectable 5000 Unit(s) SubCutaneous every 8 hours      Vital Signs Last 24 Hrs  T(C): 36.6 (25 Mar 2022 04:23), Max: 36.9 (24 Mar 2022 16:53)  T(F): 97.8 (25 Mar 2022 04:23), Max: 98.5 (24 Mar 2022 20:31)  HR: 79 (25 Mar 2022 04:23) (62 - 79)  BP: 165/64 (25 Mar 2022 04:23) (126/73 - 165/64)  BP(mean): --  RR: 18 (25 Mar 2022 04:23) (16 - 18)  SpO2: 94% (25 Mar 2022 04:23) (94% - 97%)  I&O's Detail    24 Mar 2022 07:01  -  25 Mar 2022 07:00  --------------------------------------------------------  IN:    Lactated Ringers: 240 mL    Oral Fluid: 1320 mL  Total IN: 1560 mL    OUT:    Bulb (mL): 6.5 mL    Voided (mL): 1150 mL  Total OUT: 1156.5 mL    Total NET: 403.5 mL          Physical Exam:  Gen: NAD, resting comfortably in bed  C/V: NSR  Pulm: Nonlabored breathing, no respiratory distress,  Abd: soft, aTTP, ND, incisions c/d/i, EVE x1 SS  Extrem: WWP, no calf edema, SCDs in place    LABS:                        11.3   7.57  )-----------( 209      ( 24 Mar 2022 06:44 )             36.6     03-24    134<L>  |  103  |  10  ----------------------------<  96  3.6   |  22  |  0.93    Ca    9.0      24 Mar 2022 06:44  Phos  3.0     03-24  Mg     1.7     03-24            RADIOLOGY & ADDITIONAL STUDIES:    76 year old female with PMH of multiple buts of diverticulitis presents for sigmoid colon resection. Patient states first episode of diverticulitis in December and was admitted for sigmoid diverticulitis with contained perforation. Last colonoscopy in 2015 and reportedly had a benign polyp. Pt is now s/p elective robotic assisted sigmoidectomy, small bowel resection on 3/23. liquid dark BMs overnight. Will follow BMs and AM CBC. If deemed fine will d/c home.     Pain/nausea control prn  LFD  HSQ/SCDs  EVE x 1  OOBA/IS  d/c pending AM Hgb and seeing quality of BMs        
Surgery Post-Op Note    Procedure: robotic assisted sigmoidectomy, small bowel resection    Diagnosis/Indication: Recurrent Diverticulitis     Surgeon: Mirian    S: Pt complains of nausea. Denies CP, SOB. Abdominal Pain controlled with medication.    O:  T(C): 36.7 (03-23-22 @ 12:40), Max: 36.7 (03-23-22 @ 12:40)  T(F): 98 (03-23-22 @ 12:40), Max: 98 (03-23-22 @ 12:40)  HR: 82 (03-23-22 @ 14:00) (78 - 93)  BP: 153/70 (03-23-22 @ 14:00) (151/62 - 173/79)  RR: 22 (03-23-22 @ 14:00) (13 - 24)  SpO2: 100% (03-23-22 @ 14:00) (98% - 100%)  Wt(kg): --          Physical exam:   Gen: NAD, resting comfortably in bed  C/V: NSR  Pulm: Nonlabored breathing, no respiratory distress, on nonrebreather (colon bundle)  Abd: soft, aTTP, ND, incisions c/d/i, EVE x1 SS more on sanguinous side post op   Extrem: WWP, no calf edema, SCDs in place      A/P: 76yFemale s/p above procedure    Pain/nausea control prn  CLD/IVF  HSQ/SCDs  EVE x 1  OOBA/IS  Mary Lou   labs   
INTERVAL HPI/OVERNIGHT EVENTS: EVARISTO, VSS     STATUS POST: 3/23: robotic assisted sigmoidectomy, small bowel resection    POST OPERATIVE DAY #: 1    SUBJECTIVE: Pt seen and examined at bedside this am by surgery team. Tolerating diet, pain well controlled. Passing persistent flatus. Denies f/n/v/cp/sob.    MEDICATIONS  (STANDING):  acetaminophen     Tablet .. 1000 milliGRAM(s) Oral every 6 hours  BUpivacaine liposome 1.3% Injectable (no eMAR) 20 milliLiter(s) Local Injection once  heparin   Injectable 5000 Unit(s) SubCutaneous every 8 hours  ketorolac   Injectable 15 milliGRAM(s) IV Push every 6 hours  lactated ringers. 1000 milliLiter(s) (40 mL/Hr) IV Continuous <Continuous>    MEDICATIONS  (PRN):  HYDROmorphone  Injectable 0.5 milliGRAM(s) IV Push every 4 hours PRN Severe Pain (7 - 10)  ondansetron Injectable 4 milliGRAM(s) IV Push every 6 hours PRN Nausea and/or Vomiting    Vital Signs Last 24 Hrs  T(C): 36.7 (24 Mar 2022 05:02), Max: 37.1 (23 Mar 2022 23:51)  T(F): 98.1 (24 Mar 2022 05:02), Max: 98.7 (23 Mar 2022 23:51)  HR: 67 (24 Mar 2022 05:02) (67 - 93)  BP: 109/55 (24 Mar 2022 05:02) (109/55 - 173/79)  BP(mean): 94 (23 Mar 2022 14:59) (94 - 113)  RR: 18 (24 Mar 2022 05:02) (13 - 24)  SpO2: 96% (24 Mar 2022 05:02) (94% - 100%)    PHYSICAL EXAM:    Constitutional: A&Ox3, NAD    Respiratory: non labored breathing, no respiratory distress    Cardiovascular: NSR, RRR    Gastrointestinal: abdomen soft, nd, appropriately ttp to incisions, c/d/i, EVE x1 SS OP    Genitourinary: Barreto in place draining clear yellow urine     Extremities: WWP, no calf edema, SCDs in place      I&O's Detail    23 Mar 2022 07:01  -  24 Mar 2022 07:00  --------------------------------------------------------  IN:    IV PiggyBack: 100 mL    Lactated Ringers: 640 mL  Total IN: 740 mL    OUT:    Bulb (mL): 155 mL    Indwelling Catheter - Urethral (mL): 935 mL  Total OUT: 1090 mL    Total NET: -350 mL          LABS:                        11.3   7.57  )-----------( 209      ( 24 Mar 2022 06:44 )             36.6     03-24    134<L>  |  103  |  10  ----------------------------<  96  3.6   |  22  |  0.93    Ca    9.0      24 Mar 2022 06:44  Phos  3.0     03-24  Mg     1.7     03-24            RADIOLOGY & ADDITIONAL STUDIES:

## 2022-03-28 ENCOUNTER — NON-APPOINTMENT (OUTPATIENT)
Age: 77
End: 2022-03-28

## 2022-03-28 PROBLEM — M51.26 OTHER INTERVERTEBRAL DISC DISPLACEMENT, LUMBAR REGION: Chronic | Status: ACTIVE | Noted: 2022-03-22

## 2022-03-28 PROBLEM — K57.92 DIVERTICULITIS OF INTESTINE, PART UNSPECIFIED, WITHOUT PERFORATION OR ABSCESS WITHOUT BLEEDING: Chronic | Status: ACTIVE | Noted: 2022-03-22

## 2022-03-28 PROBLEM — N20.0 CALCULUS OF KIDNEY: Chronic | Status: ACTIVE | Noted: 2022-03-22

## 2022-03-30 DIAGNOSIS — K57.20 DIVERTICULITIS OF LARGE INTESTINE WITH PERFORATION AND ABSCESS WITHOUT BLEEDING: ICD-10-CM

## 2022-03-31 LAB — SURGICAL PATHOLOGY STUDY: SIGNIFICANT CHANGE UP

## 2022-04-11 ENCOUNTER — APPOINTMENT (OUTPATIENT)
Dept: COLORECTAL SURGERY | Facility: CLINIC | Age: 77
End: 2022-04-11
Payer: MEDICARE

## 2022-04-11 VITALS
TEMPERATURE: 97.9 F | WEIGHT: 118 LBS | HEART RATE: 81 BPM | HEIGHT: 61 IN | DIASTOLIC BLOOD PRESSURE: 71 MMHG | SYSTOLIC BLOOD PRESSURE: 186 MMHG | BODY MASS INDEX: 22.28 KG/M2

## 2022-04-11 DIAGNOSIS — K57.32 DIVERTICULITIS OF LARGE INTESTINE W/OUT PERFORATION OR ABSCESS W/OUT BLEEDING: ICD-10-CM

## 2022-04-11 PROCEDURE — 99024 POSTOP FOLLOW-UP VISIT: CPT

## 2022-04-11 NOTE — HISTORY OF PRESENT ILLNESS
[FreeTextEntry1] : Pt is a 75 yo F with hx diverticulitis \par \par pt presents today for pre-op s/p 3/23/22 robotic sigmoid colectomy and take down and drainage of a complex pelvic abscess, ileal resection, and sigmoidoscopy. \par \par Last seen 2/7/22 for initial evaluation of diverticulitis. 12/19- 12/23/21 hx contained perforation and admission to [facility]. Recovered well. \par \par CT A/P 2/23/22: \par IMPRESSION:\par Since December 19, 2021, extensive sigmoid diverticulosis, decreased perisigmoid inflammatory fat stranding however persistent sigmoid wall thickening and persistent small probable contained leak versus sinus tract arising from medial superior sigmoid wall as detailed above. No abscess or evidence of fistula.\par \par 3/23/22 robotic sigmoid colectomy and take down and drainage of a complex pelvic abscess, ileal resection, and sigmoidoscopy. \par Hospital course unremarkable 3/23/-3/25/22. \par \par Surgical Pathology Report - Auth (Verified)\par \par Specimen(s) Submitted\par 1  Ileum\par 2  Sigmoid rectum\par 3  Distal and proximal donuts\par \par Final Diagnosis\par 1. Ileum, partial resection:\par - Numerous fibrous adhesions with acute and chronic inflammation\par \par - Unremarkable mucosa\par \par 2. Sigmoid colon and rectum, resection:\par - Acute diverticulitis with shay-diverticular abscess formation in a\par background of diverticulosis\par - Marked acute serositis with fibrosis and acute inflammation\par - See note\par \par Note: There is marked acute inflammation (abscess formation) with\par fecal material and foreign body giant cell reaction. These findings\par are suggestive of microscopic perforation. Correlation with clinical\par presentation and imaging advised.\par \par 3. Proximal and distal donuts, excision:\par - Unremarkable colonic mucosa and wall\par \par \par \par pt needs Colonoscopy with GI for surveillance of synchronous colorectal cancer and polyps\par \par

## 2022-04-11 NOTE — ASSESSMENT
[FreeTextEntry1] : Recovering well from surgery.  Advised to liberalize diet and activity.  Can increase fiber slowly.\par \par Recommend screening colonoscopy in 4 to 6 months.

## 2022-04-11 NOTE — PHYSICAL EXAM
[Abdomen Masses] : No abdominal masses [Abdomen Tenderness] : ~T No ~M abdominal tenderness [No HSM] : no hepatosplenomegaly [JVD] : no jugular venous distention  [Normal Breath Sounds] : Normal breath sounds [Normal Heart Sounds] : normal heart sounds [No Rash or Lesion] : No rash or lesion [Alert] : alert [Calm] : calm [de-identified] : Abdomen is soft, nontender, nondistended. Incisions are well healed. No hernia or masses\par

## 2022-09-02 ENCOUNTER — NON-APPOINTMENT (OUTPATIENT)
Age: 77
End: 2022-09-02

## 2023-08-10 NOTE — H&P ADULT - BIRTH SEX
Female Azelaic Acid Pregnancy And Lactation Text: This medication is considered safe during pregnancy and breast feeding.

## (undated) DEVICE — SUT VICRYL PLUS 0 36" CT-1

## (undated) DEVICE — NDL SPINAL 22G X 3.5" (BLACK)

## (undated) DEVICE — SPONGE ENDO PEANUT 5MM

## (undated) DEVICE — KIT ENDO PROCEDURE CUST W/VLV

## (undated) DEVICE — DRAPE TOP SHEET 53" X 101"

## (undated) DEVICE — XI ENDOWRIST STAPLER SHEATH

## (undated) DEVICE — STAPLER COVIDIEN ENDO GIA SHORT HANDLE

## (undated) DEVICE — BLADE SURGICAL #15 CARBON

## (undated) DEVICE — XI VESSEL SEALER

## (undated) DEVICE — Device

## (undated) DEVICE — XI ARM NEEDLE DRIVER LARGE

## (undated) DEVICE — XI DRAPE COLUMN

## (undated) DEVICE — SUT MONOCRYL 4-0 27" PS-2 UNDYED

## (undated) DEVICE — XI ARM SCISSOR MONO CURVED

## (undated) DEVICE — DRAPE 1/2 SHEET 40X57"

## (undated) DEVICE — DRSG DERMABOND 0.7ML

## (undated) DEVICE — SUT VICRYL 3-0 27" SH

## (undated) DEVICE — XI ARM FORCEP FENESTRATED BIPOLAR 8MM

## (undated) DEVICE — XI OBTURATOR OPTICAL BLADELESS 8MM

## (undated) DEVICE — XI SEAL UNIV 5- 8 MM

## (undated) DEVICE — LIGASURE BLUNT TIP 37CM

## (undated) DEVICE — ELCTR BOVIE PENCIL HANDPIECE ROCKER SWITCH 15FT

## (undated) DEVICE — VENODYNE/SCD SLEEVE CALF MEDIUM

## (undated) DEVICE — DRAPE 3/4 SHEET 52X76"

## (undated) DEVICE — XI ENDOWRIST 12 - 8 MM CANNULA REDUCER

## (undated) DEVICE — MARKING PEN W RULER

## (undated) DEVICE — GLV 8 PROTEXIS (WHITE)

## (undated) DEVICE — TIP METZENBAUM SCISSOR MONOPOLAR ENDOCUT (ORANGE)

## (undated) DEVICE — POSITIONER STRAP KNEE & BODY 3X60" DISP

## (undated) DEVICE — XI TIP COVER

## (undated) DEVICE — PACK PERI GYN

## (undated) DEVICE — SUT VICRYL 0 54" TIES

## (undated) DEVICE — WARMING BLANKET UPPER ADULT

## (undated) DEVICE — GLV 7.5 PROTEXIS (WHITE)

## (undated) DEVICE — XI 12MM AND STAPLER CANNULA SEAL

## (undated) DEVICE — SUT PDS II PLUS 1 27" CT-1

## (undated) DEVICE — PACK GENERAL LAPAROSCOPY

## (undated) DEVICE — XI ARM GRASPER TIP UP FENESTRATED

## (undated) DEVICE — D HELP - CLEARVIEW CLEARIFY SYSTEM

## (undated) DEVICE — POSITIONER PINK PAD PIGAZZI SYSTEM XL W ARM PROTECTOR

## (undated) DEVICE — SUT VICRYL 0 18" ENDOLOOP LIGATURE

## (undated) DEVICE — SYR LUER LOK 30CC

## (undated) DEVICE — STOPCOCK 4-WAY

## (undated) DEVICE — SUT SILK 3-0 18" SH (POP-OFF)

## (undated) DEVICE — LONE STAR RETRACTOR RING 14.1X14.1CM DISP

## (undated) DEVICE — TUBING STRYKER PNEUMOCLEAR HIGH FLOW HEATED

## (undated) DEVICE — XI DRAPE ARM

## (undated) DEVICE — PACK GENERAL CLOSING

## (undated) DEVICE — FOLEY TRAY 16FR 5CC LF UMETER CLOSED

## (undated) DEVICE — SPECIMEN CONTAINER 4OZ

## (undated) DEVICE — INSUFFLATION NDL COVIDIEN SURGINEEDLE VERESS 120MM

## (undated) DEVICE — XI STAPLER SUREFORM 60